# Patient Record
Sex: MALE | Race: BLACK OR AFRICAN AMERICAN
[De-identification: names, ages, dates, MRNs, and addresses within clinical notes are randomized per-mention and may not be internally consistent; named-entity substitution may affect disease eponyms.]

---

## 2020-11-30 ENCOUNTER — HOSPITAL ENCOUNTER (EMERGENCY)
Dept: HOSPITAL 62 - ER | Age: 40
Discharge: HOME | End: 2020-11-30
Payer: SELF-PAY

## 2020-11-30 VITALS — DIASTOLIC BLOOD PRESSURE: 81 MMHG | SYSTOLIC BLOOD PRESSURE: 132 MMHG

## 2020-11-30 DIAGNOSIS — S29.012A: Primary | ICD-10-CM
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
## 2020-11-30 PROCEDURE — 99283 EMERGENCY DEPT VISIT LOW MDM: CPT

## 2020-11-30 NOTE — ER DOCUMENT REPORT
HPI





- HPI


Patient complains to provider of: upper back pain


Time Seen by Provider: 11/30/20 14:43


Onset: Other - 10 d


Onset/Duration: Persistent


Quality of pain: Achy


Pain Level: 4


Context: 





Patient presents complaining of upper back pain for the past 10 days.  Patient 

denies any injury.  Patient denies any fever or cough.  Patient states pain is 

worse with movement.  Patient states he does do a lot of heavy lifting at his 

place of employment.


Associated Symptoms: denies: Chest pain, Nonproductive cough, Productive cough, 

Fever, Vomiting


Exacerbated by: Movement


Relieved by: Remaining still


Similar symptoms previously: No


Recently seen / treated by doctor: No





- ROS


ROS below otherwise negative: Yes


Systems Reviewed and Negative: Yes All other systems reviewed and negative





- CONSTITUTIONAL


Constitutional: DENIES: Fever, Chills





- NEURO


Neurology: DENIES: Weakness





- CARDIOVASCULAR


Cardiovascular: DENIES: Chest pain





- RESPIRATORY


Respiratory: DENIES: Trouble Breathing, Coughing





- GASTROINTESTINAL


Gastrointestinal: DENIES: Nausea





- MUSCULOSKELETAL


Musculoskeletal: REPORTS: Back Pain.  DENIES: Extremity pain, Neck Pain





- DERM


Skin Color: Normal


Skin Problems: None





Past Medical History





- General


Information source: Patient





- Social History


Smoking Status: Never Smoker


Frequency of alcohol use: None


Drug Abuse: None


Occupation: Retail


Family History: Reviewed & Not Pertinent





- Medical History


Medical History: Negative


Past Surgical History: Reports: Hx Herniorrhaphy





- Immunizations


Hx Diphtheria, Pertussis, Tetanus Vaccination: Yes





Vertical Provider Document





- CONSTITUTIONAL


Agree With Documented VS: Yes


Exam Limitations: No Limitations


General Appearance: WD/WN, No Apparent Distress





- INFECTION CONTROL


TRAVEL OUTSIDE OF THE [place] IN LAST 30 DAYS: No





- HEENT


HEENT: Atraumatic, Normocephalic





- NECK


Neck: Normal Inspection, Supple





- RESPIRATORY


Respiratory: Breath Sounds Normal, No Respiratory Distress





- CARDIOVASCULAR


Cardiovascular: Regular Rate, Regular Rhythm, No Murmur


Pulses: Normal: Radial





- BACK


Back: Abnormal Inspection - Bilateral trapezius muscle tenderness, right worse 

than left.  negative: CVA Tenderness-Right, CVA Tenderness-Left


Notes: 





No spinal midline tenderness, step-off or deformity





- MUSCULOSKELETAL/EXTREMETIES


Musculoskeletal/Extremeties: MAEW, FROM, Non-Tender





- NEURO


Level of Consciousness: Awake, Alert, Appropriate


Motor/Sensory: No Motor Deficit





- DERM


Integumentary: Warm, Dry, No Rash





Course





- Re-evaluation


Re-evalutation: 





11/30/20 15:06


The patient presents with back pain without signs of spinal cord compression, 

cauda equina syndrome, infection, aneurysm, or other serious etiology.  The 

patient is neurologically intact.  Given the extremely risk of these diagnoses 

further testing and evaluation for these possibilities does not appear to be 

indicated at this time.  Patient has been instructed to return if the symptoms 

worsen or change in any way.





- Vital Signs


Vital signs: 


                                        











Temp Pulse Resp BP Pulse Ox


 


 98.5 F   64   16   132/81 H  98 


 


 11/30/20 14:41  11/30/20 14:41  11/30/20 14:41  11/30/20 14:41  11/30/20 14:41














Discharge





- Discharge


Clinical Impression: 


Upper back strain


Qualifiers:


 Encounter type: initial encounter Qualified Code(s): S29.012A - Strain of 

muscle and tendon of back wall of thorax, initial encounter





Condition: Stable


Disposition: HOME, SELF-CARE


Instructions:  Muscle Relaxers ([facility]), Upper Back Strain ([facility]), Warm Packs ([facility])


Additional Instructions: 


Return immediately for any new or worsening symptoms





Followup with your primary care provider, call tomorrow to make a followup 

appointment








Prescriptions: 


Lidocaine [Lidoderm 5% (700 mg) Transdermal Patch] 1 patch TP DAILY PRN #10 

adh..patch


 PRN Reason: 


Naproxen [Naprosyn 250 Nmg Tablet] 1 tab PO BID #14 tablet


Methocarbamol [Robaxin 500 Mg Tablet] 500 mg PO QID PRN #24 tablet


 PRN Reason: 


Forms:  Restricted Release, Return to Work


[facility] 


[facility] - Follow up as needed

## 2020-12-04 ENCOUNTER — HOSPITAL ENCOUNTER (EMERGENCY)
Dept: HOSPITAL 62 - ER | Age: 40
Discharge: HOME | End: 2020-12-04
Payer: SELF-PAY

## 2020-12-04 VITALS — SYSTOLIC BLOOD PRESSURE: 135 MMHG | DIASTOLIC BLOOD PRESSURE: 80 MMHG

## 2020-12-04 DIAGNOSIS — M48.02: ICD-10-CM

## 2020-12-04 DIAGNOSIS — Z88.6: ICD-10-CM

## 2020-12-04 DIAGNOSIS — R20.0: Primary | ICD-10-CM

## 2020-12-04 DIAGNOSIS — Z88.8: ICD-10-CM

## 2020-12-04 DIAGNOSIS — M47.812: ICD-10-CM

## 2020-12-04 DIAGNOSIS — M79.18: ICD-10-CM

## 2020-12-04 LAB
ADD MANUAL DIFF: NO
ALBUMIN SERPL-MCNC: 4.2 G/DL (ref 3.5–5)
ALP SERPL-CCNC: 71 U/L (ref 38–126)
ANION GAP SERPL CALC-SCNC: 8 MMOL/L (ref 5–19)
AST SERPL-CCNC: 30 U/L (ref 17–59)
BASOPHILS # BLD AUTO: 0.1 10^3/UL (ref 0–0.2)
BASOPHILS NFR BLD AUTO: 0.8 % (ref 0–2)
BILIRUB DIRECT SERPL-MCNC: 0.1 MG/DL (ref 0–0.4)
BILIRUB SERPL-MCNC: 0.4 MG/DL (ref 0.2–1.3)
BUN SERPL-MCNC: 12 MG/DL (ref 7–20)
CALCIUM: 9.4 MG/DL (ref 8.4–10.2)
CHLORIDE SERPL-SCNC: 104 MMOL/L (ref 98–107)
CO2 SERPL-SCNC: 25 MMOL/L (ref 22–30)
EOSINOPHIL # BLD AUTO: 0.5 10^3/UL (ref 0–0.6)
EOSINOPHIL NFR BLD AUTO: 6.3 % (ref 0–6)
ERYTHROCYTE [DISTWIDTH] IN BLOOD BY AUTOMATED COUNT: 12.7 % (ref 11.5–14)
GLUCOSE SERPL-MCNC: 106 MG/DL (ref 75–110)
HCT VFR BLD CALC: 42.4 % (ref 37.9–51)
HGB BLD-MCNC: 14.3 G/DL (ref 13.5–17)
LYMPHOCYTES # BLD AUTO: 2.3 10^3/UL (ref 0.5–4.7)
LYMPHOCYTES NFR BLD AUTO: 30.7 % (ref 13–45)
MCH RBC QN AUTO: 31.2 PG (ref 27–33.4)
MCHC RBC AUTO-ENTMCNC: 33.8 G/DL (ref 32–36)
MCV RBC AUTO: 92 FL (ref 80–97)
MONOCYTES # BLD AUTO: 0.5 10^3/UL (ref 0.1–1.4)
MONOCYTES NFR BLD AUTO: 7.1 % (ref 3–13)
NEUTROPHILS # BLD AUTO: 4.1 10^3/UL (ref 1.7–8.2)
NEUTS SEG NFR BLD AUTO: 55.1 % (ref 42–78)
PLATELET # BLD: 246 10^3/UL (ref 150–450)
POTASSIUM SERPL-SCNC: 4 MMOL/L (ref 3.6–5)
PROT SERPL-MCNC: 7.7 G/DL (ref 6.3–8.2)
RBC # BLD AUTO: 4.59 10^6/UL (ref 4.35–5.55)
TOTAL CELLS COUNTED % (AUTO): 100 %
WBC # BLD AUTO: 7.5 10^3/UL (ref 4–10.5)

## 2020-12-04 PROCEDURE — 93010 ELECTROCARDIOGRAM REPORT: CPT

## 2020-12-04 PROCEDURE — 84484 ASSAY OF TROPONIN QUANT: CPT

## 2020-12-04 PROCEDURE — 99285 EMERGENCY DEPT VISIT HI MDM: CPT

## 2020-12-04 PROCEDURE — 83735 ASSAY OF MAGNESIUM: CPT

## 2020-12-04 PROCEDURE — 93005 ELECTROCARDIOGRAM TRACING: CPT

## 2020-12-04 PROCEDURE — 85025 COMPLETE CBC W/AUTO DIFF WBC: CPT

## 2020-12-04 PROCEDURE — 72050 X-RAY EXAM NECK SPINE 4/5VWS: CPT

## 2020-12-04 PROCEDURE — 36415 COLL VENOUS BLD VENIPUNCTURE: CPT

## 2020-12-04 PROCEDURE — 80053 COMPREHEN METABOLIC PANEL: CPT

## 2020-12-04 PROCEDURE — 73030 X-RAY EXAM OF SHOULDER: CPT

## 2020-12-04 PROCEDURE — 71046 X-RAY EXAM CHEST 2 VIEWS: CPT

## 2020-12-04 NOTE — RADIOLOGY REPORT (SQ)
EXAM DESCRIPTION:  CERV SP 4 OR 5 VIEWS



IMAGES COMPLETED DATE/TIME:  12/4/2020 3:07 pm



REASON FOR STUDY:  Numbness right shoulder and arm



COMPARISON:  None.



NUMBER OF VIEWS:  Five views.



TECHNIQUE:  AP, lateral, obliques and odontoid radiographic images acquired of the cervical spine.



LIMITATIONS:  None.



FINDINGS:  MINERALIZATION: Normal.

ALIGNMENT: Anatomic.

VERTEBRAE: The cervical vertebral body heights are preserved.

DISCS: The C5-C6 intervertebral disc space is narrowed.  There are anterolateral osteophytes at C4-C5
, C5-C6 and C6-C7.

FORAMINA: Mild to moderate osteophytic foraminal stenosis on the right at C3-C4.

LATERAL AND POSTERIOR ELEMENTS: Intact.

HARDWARE: None in the spine.

SOFT TISSUES: No acute gross abnormality.

OTHER: No other findings.



IMPRESSION:  Degenerative spondylosis of the cervical spine with mild to moderate foraminal stenosis 
on the right at C3-C4.



TECHNICAL DOCUMENTATION:  JOB ID:  [id number]

 2011 [employer]- All Rights Reserved



Reading location - IP/workstation name: [email or web address]

## 2020-12-04 NOTE — ER DOCUMENT REPORT
ED General





- General


Chief Complaint: Numbness of Arm


Stated Complaint: ARM NUMBNESS


Time Seen by Provider: 12/04/20 14:17


Mode of Arrival: Ambulatory


Notes: 





HPI: 40-year-old male who presents today stating around 2 days he has had some 

pain to the right trapezius that radiates down his right arm.  He denies any and

all swelling of the arm, weakness of the arm, discoloration.  Patient is a 

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  He denies headache, 

neck pain, neck trauma, falls, chest pain, palpitations, weakness or numbness to

his legs.  He has no left arm symptoms.








ROS:  See HPI


All other review of systems reviewed and otherwise negative





Reviewed vital signs and nursing note as charted by RN.





PHYSICAL EXAM: 





CONSTITUTIONAL: Alert and oriented and responds appropriately to questions. W

ell-appearing; well-nourished





HEAD: Normocephalic; atraumatic





EYES: PERRL; full extraocular range of motion; no nystagmus





ENT: Normal nose; no rhinorrhea; moist mucous membranes; pharynx without lesions

noted





NECK: Supple without meningismus; non-tender to palpation along the midline 

cervical spine with no step-offs or swelling noted.  Patient does have some 

right-sided paraspinal muscular tenderness with no swelling erythema.  No 

lymphadenopathy or carotid bruits.





CARD: Regular rate and rhythm; no murmurs; symmetric distal pulses





RESP: Normal chest excursion without splinting or tachypnea; breath sounds clear

and equal bilaterally; no wheezes, no rhonchi, no rales





ABD/GI: Normal bowel sounds; non-distended; soft, non-tender





BACK: The back appears normal and is non-tender to palpation





EXT: Normal ROM in all joints; non-tender to palpation; no edema; excellent 

strength; no wasting





SKIN: No acute lesions noted





NEURO: CN 2-12 intact; 5/5 bilateral upper and lower extremity strength with 

sensation intact to light touch; I do not detect a sensation deficit





PSYCH: The patient's mood and manner are appropriate. Grooming and personal 

hygiene are appropriate.


TRAVEL OUTSIDE OF THE [place] IN LAST 30 DAYS: No





- Related Data


Allergies/Adverse Reactions: 


                                        





acetaminophen [From Ultracet] Allergy (Verified 11/30/20 14:47)


   


tramadol [From Ultracet] Allergy (Verified 11/30/20 14:47)


   











Past Medical History





- General


Information source: Patient





- Social History


Smoking Status: Never Smoker


Frequency of alcohol use: Rare


Drug Abuse: None


Lives with: Alone


Family History: Reviewed & Not Pertinent





- Past Medical History


Cardiac Medical History: Reports: None


Pulmonary Medical History: Reports: None


EENT Medical History: Reports: None


Neurological Medical History: Reports: None


Endocrine Medical History: Reports: None


Renal/ Medical History: Reports: None


Malignancy Medical History: Reports None


GI Medical History: Reports: None


Musculoskeletal Medical History: Reports Hx Musculoskeletal Deformity, Reports 

Hx Musculoskeletal Trauma


Psychiatric Medical History: Reports: None


Traumatic Medical History: Reports: Hx Fractures - Left clavicle


Infectious Medical History: Reports: None


Past Surgical History: Reports: Hx Inguinal Hernia





- Immunizations


Immunizations up to date: No


Hx Diphtheria, Pertussis, Tetanus Vaccination: No





Physical Exam





- Vital signs


Vitals: 





                                        











Temp Pulse Resp BP Pulse Ox


 


 98.4 F   61   18   135/76 H  99 


 


 12/04/20 14:11  12/04/20 14:11  12/04/20 14:11  12/04/20 14:11  12/04/20 14:11














Course





- Re-evaluation


Re-evalutation: 





Given the above history and physical examination this well-appearing male in no 

acute distress, with examination and history as recorded, I believe this is most

likely musculoskeletal in nature with possibly a muscle strain or radiculopathy.

 I do believe CVA to be extremely unlikely.  Patient has had no trauma, neck 

pain or swelling, atrophy of the muscle, or symptoms to any other extremity.  We

will provide steroids, pain medications, strict return precautions, and a sling.

 Orders in triage will be performed.





12/04/20 17:14


Results as recorded.  No change in exam.  We will follow the above plan with 

strict return precautions.  Patient is not a diabetic and should tolerate the 

prednisone well.








- Vital Signs


Vital signs: 





                                        











Temp Pulse Resp BP Pulse Ox


 


 98.4 F   61   18   135/76 H  100 


 


 12/04/20 14:11  12/04/20 14:11  12/04/20 14:11  12/04/20 14:11  12/04/20 16:43














- Laboratory


Result Diagrams: 


                                 12/04/20 14:40





                                 12/04/20 14:40


Laboratory results interpreted by me: 





                                        











  12/04/20 12/04/20





  14:40 14:40


 


Eos % (Auto)  6.3 H 


 


Sodium   136.9 L














Discharge





- Discharge


Clinical Impression: 


 Right arm numbness





Condition: Fair


Disposition: HOME, SELF-CARE


Additional Instructions: 


Come back immediately for any increased pain, swelling of the arm, weakness or 

numbness, any right lower extremity symptoms, or any other acute problems.  In 

addition to the medications that I provided, please take ibuprofen 600 mg every 

6 hours for the next 5 days.


Prescriptions: 


Prednisone [Deltasone 20 mg Tablet] 20 mg PO DAILY #12 tablet


Oxycodone HCl 5 mg PO Q8H #10 capsule


Referrals: 


ADALGISA WARD DO [ACTIVE STAFF] - Follow up as needed

## 2020-12-04 NOTE — RADIOLOGY REPORT (SQ)
EXAM DESCRIPTION:  SHOULDER RIGHT 2 OR MORE VIEWS



IMAGES COMPLETED DATE/TIME:  12/4/2020 3:07 pm



REASON FOR STUDY:  Numbness right shoulder and arm



COMPARISON:  None.



NUMBER OF VIEWS:  Three views.



TECHNIQUE:  Internal rotation, external rotation, and Y view images acquired of the right shoulder.



LIMITATIONS:  None.



FINDINGS:  MINERALIZATION: Normal.

BONES: No acute fracture.

JOINTS: No dislocation.

VISUALIZED LUNGS AND RIBS: No pneumothorax or rib fracture.

SOFT TISSUES: No radiopaque foreign body.

OTHER: No other findings.



IMPRESSION:  No acute fracture malalignment of the right shoulder.



TECHNICAL DOCUMENTATION:  JOB ID:  [id number]

 2011 [employer]- All Rights Reserved



Reading location - IP/workstation name: [email or web address]

## 2020-12-04 NOTE — EKG REPORT
SEVERITY:- ABNORMAL ECG -

SINUS BRADYCARDIA

ST ELEVATION, MULTIPLE LEADS, EARLY REPOL VS. PERICARDITIS

:

Confirmed by: Carlton Roque MD 04-Dec-2020 17:23:33

## 2020-12-04 NOTE — RADIOLOGY REPORT (SQ)
EXAM DESCRIPTION:  CHEST 2 VIEWS



IMAGES COMPLETED DATE/TIME:  12/4/2020 3:07 pm



REASON FOR STUDY:  numbness down right arm and shoulder



COMPARISON:  None.



EXAM PARAMETERS:  NUMBER OF VIEWS: Two views.

TECHNIQUE:  PA and lateral views of the chest were obtained.

RADIATION DOSE: NA

LIMITATIONS: None.



FINDINGS:  LUNGS AND PLEURA: No consolidation, pleural effusion or pneumothorax.

MEDIASTINUM AND HILAR STRUCTURES: No mediastinal or hilar contour abnormality.

HEART AND VASCULAR STRUCTURES: The cardiac silhouette and pulmonary vasculature are within normal cole
its.

BONES: No acute findings.

HARDWARE: None in the chest.

OTHER: Eventration of the right hemidiaphragm.



IMPRESSION:  No acute cardiopulmonary process.



TECHNICAL DOCUMENTATION:  JOB ID:  [id number]

 2011 [employer]- All Rights Reserved



Reading location - IP/workstation name: [email or web address]

## 2020-12-04 NOTE — ER DOCUMENT REPORT
ED Medical Screen (RME)





- General


Chief Complaint: Numbness of Arm


Stated Complaint: ARM NUMBNESS


Time Seen by Provider: 12/04/20 14:17


Mode of Arrival: Ambulatory


Information source: Patient


Notes: 





40-year-old male presented to ED for complaint of numbness to the right arm and 

shoulder.  He states he was clean from the shoulder down to the hand.  He states

it started a couple days ago.  He states that he came into the emergency room 

Monday for pain in his right neck and side and he was started on muscle relaxers

and naproxen he states the spasms and pain to the right neck and side are better

but now he has numbness to the right shoulder and arm that will not go away.  He

is alert oriented respirations regular nonlabored speaking in full sentences.











I have greeted and performed a rapid initial assessment of this patient.  A 

comprehensive ED assessment and evaluation of the patient, analysis of test 

results and completion of medical decision making process will be conducted by a

n additional ED providers.


TRAVEL OUTSIDE OF THE [place] IN LAST 30 DAYS: No





- HPI


Onset: Other


Onset/Duration: Intermittent


Quality of pain: Dull


Severity: Mild


Pain Level: 1


Associated Symptoms: Other - Patient states he has a level 1 pain to the back of

the shoulder and shoulder blade but he has numbness from the shoulder down to 

the fingers on the right hand


Exacerbated by: Denies


Relieved by: Denies


Similar symptoms previously: Yes


Recently seen / treated by doctor: Yes





- Related Data


Smoking: Quit greater than 1 year


Frequency of alcohol use: Occasional


Drug Abuse: None


Allergies/Adverse Reactions: 


                                        





acetaminophen [From Ultracet] Allergy (Verified 11/30/20 14:47)


   


tramadol [From Ultracet] Allergy (Verified 11/30/20 14:47)


   











Past Medical History





- General


Information source: Patient





- Social History


Lives with: Alone


Family history: Reviewed & Not Pertinent





- Past Medical History


Cardiac Medical History: Reports: None


Pulmonary Medical History: Reports: None


EENT Medical History: Reports: None


Neurological Medical History: Reports: None


Endocrine Medical History: Reports: None


Renal/ Medical History: Reports: None


Malignancy Medical History: Reports None


GI Medical History: Reports: None


Musculoskeltal Medical History: Reports Hx Musculoskeletal Deformity, Reports Hx

Musculoskeletal Trauma


Psychiatric Medical History: Reports: None


Traumatic Medical History: Reports: Hx Fractures - Left clavicle


Infectious Medical History: Reports: None


Past Surgical History: Reports: Hx Inguinal Hernia





- Immunizations


Immunizations up to date: No


Hx Diphtheria, Pertussis, Tetanus Vaccination: No





Physical Exam





- Vital signs


Vitals: 





                                        











Temp Pulse Resp BP Pulse Ox


 


 98.4 F   61   18   135/76 H  99 


 


 12/04/20 14:11  12/04/20 14:11  12/04/20 14:11  12/04/20 14:11  12/04/20 14:11














Course





- Vital Signs


Vital signs: 





                                        











Temp Pulse Resp BP Pulse Ox


 


 98.4 F   61   18   135/76 H  99 


 


 12/04/20 14:11  12/04/20 14:11  12/04/20 14:11  12/04/20 14:11  12/04/20 14:11